# Patient Record
Sex: FEMALE | Race: WHITE | NOT HISPANIC OR LATINO | URBAN - METROPOLITAN AREA
[De-identification: names, ages, dates, MRNs, and addresses within clinical notes are randomized per-mention and may not be internally consistent; named-entity substitution may affect disease eponyms.]

---

## 2018-06-04 ENCOUNTER — OFFICE (AMBULATORY)
Dept: URBAN - METROPOLITAN AREA PATHOLOGY 4 | Facility: PATHOLOGY | Age: 48
End: 2018-06-04

## 2018-06-04 ENCOUNTER — ON CAMPUS - OUTPATIENT (AMBULATORY)
Dept: URBAN - METROPOLITAN AREA HOSPITAL 2 | Facility: HOSPITAL | Age: 48
End: 2018-06-04
Payer: COMMERCIAL

## 2018-06-04 VITALS
DIASTOLIC BLOOD PRESSURE: 99 MMHG | SYSTOLIC BLOOD PRESSURE: 159 MMHG | HEIGHT: 63 IN | HEART RATE: 71 BPM | HEART RATE: 78 BPM | DIASTOLIC BLOOD PRESSURE: 91 MMHG | HEART RATE: 83 BPM | DIASTOLIC BLOOD PRESSURE: 106 MMHG | SYSTOLIC BLOOD PRESSURE: 123 MMHG | SYSTOLIC BLOOD PRESSURE: 156 MMHG | SYSTOLIC BLOOD PRESSURE: 128 MMHG | SYSTOLIC BLOOD PRESSURE: 136 MMHG | DIASTOLIC BLOOD PRESSURE: 86 MMHG | OXYGEN SATURATION: 95 % | OXYGEN SATURATION: 97 % | RESPIRATION RATE: 16 BRPM | HEART RATE: 67 BPM | DIASTOLIC BLOOD PRESSURE: 89 MMHG | RESPIRATION RATE: 18 BRPM | TEMPERATURE: 97.6 F | OXYGEN SATURATION: 96 % | WEIGHT: 186.8 LBS | HEART RATE: 70 BPM | HEART RATE: 73 BPM | SYSTOLIC BLOOD PRESSURE: 139 MMHG | SYSTOLIC BLOOD PRESSURE: 144 MMHG | OXYGEN SATURATION: 99 % | SYSTOLIC BLOOD PRESSURE: 132 MMHG | HEART RATE: 72 BPM

## 2018-06-04 DIAGNOSIS — K64.1 SECOND DEGREE HEMORRHOIDS: ICD-10-CM

## 2018-06-04 DIAGNOSIS — Z86.010 PERSONAL HISTORY OF COLONIC POLYPS: ICD-10-CM

## 2018-06-04 DIAGNOSIS — Z80.0 FAMILY HISTORY OF MALIGNANT NEOPLASM OF DIGESTIVE ORGANS: ICD-10-CM

## 2018-06-04 DIAGNOSIS — K21.9 GASTRO-ESOPHAGEAL REFLUX DISEASE WITHOUT ESOPHAGITIS: ICD-10-CM

## 2018-06-04 DIAGNOSIS — D12.5 BENIGN NEOPLASM OF SIGMOID COLON: ICD-10-CM

## 2018-06-04 LAB
GI HISTOLOGY: A. UNSPECIFIED: (no result)
GI HISTOLOGY: PDF REPORT: (no result)

## 2018-06-04 PROCEDURE — 45385 COLONOSCOPY W/LESION REMOVAL: CPT | Performed by: INTERNAL MEDICINE

## 2018-06-04 PROCEDURE — 43235 EGD DIAGNOSTIC BRUSH WASH: CPT | Performed by: INTERNAL MEDICINE

## 2018-06-04 PROCEDURE — 88305 TISSUE EXAM BY PATHOLOGIST: CPT | Mod: 33 | Performed by: INTERNAL MEDICINE

## 2018-06-04 RX ADMIN — PROPOFOL: 10 INJECTION, EMULSION INTRAVENOUS at 13:36

## 2018-12-17 ENCOUNTER — OUTSIDE FACILITY SERVICE (OUTPATIENT)
Dept: CARDIAC SURGERY | Facility: CLINIC | Age: 48
End: 2018-12-17

## 2018-12-17 PROCEDURE — 33517 CABG ARTERY-VEIN SINGLE: CPT | Performed by: THORACIC SURGERY (CARDIOTHORACIC VASCULAR SURGERY)

## 2018-12-17 PROCEDURE — 33508 ENDOSCOPIC VEIN HARVEST: CPT | Performed by: THORACIC SURGERY (CARDIOTHORACIC VASCULAR SURGERY)

## 2018-12-17 PROCEDURE — 33533 CABG ARTERIAL SINGLE: CPT

## 2018-12-17 PROCEDURE — 33517 CABG ARTERY-VEIN SINGLE: CPT

## 2018-12-17 PROCEDURE — 33508 ENDOSCOPIC VEIN HARVEST: CPT

## 2018-12-17 PROCEDURE — 33533 CABG ARTERIAL SINGLE: CPT | Performed by: THORACIC SURGERY (CARDIOTHORACIC VASCULAR SURGERY)

## 2018-12-21 ENCOUNTER — TELEPHONE (OUTPATIENT)
Dept: CARDIAC SURGERY | Facility: CLINIC | Age: 48
End: 2018-12-21

## 2019-01-30 ENCOUNTER — OFFICE VISIT (OUTPATIENT)
Dept: CARDIAC SURGERY | Facility: CLINIC | Age: 49
End: 2019-01-30

## 2019-01-30 VITALS
OXYGEN SATURATION: 97 % | RESPIRATION RATE: 20 BRPM | WEIGHT: 190.6 LBS | BODY MASS INDEX: 33.77 KG/M2 | DIASTOLIC BLOOD PRESSURE: 88 MMHG | HEART RATE: 92 BPM | SYSTOLIC BLOOD PRESSURE: 130 MMHG | TEMPERATURE: 97.6 F | HEIGHT: 63 IN

## 2019-01-30 DIAGNOSIS — Z95.1 HX OF CABG: Primary | ICD-10-CM

## 2019-01-30 PROCEDURE — 99024 POSTOP FOLLOW-UP VISIT: CPT | Performed by: THORACIC SURGERY (CARDIOTHORACIC VASCULAR SURGERY)

## 2019-01-30 RX ORDER — ASPIRIN 325 MG
81 TABLET ORAL DAILY
COMMUNITY
End: 2020-07-10 | Stop reason: DRUGHIGH

## 2019-01-30 RX ORDER — METOPROLOL SUCCINATE 25 MG/1
25 TABLET, EXTENDED RELEASE ORAL DAILY
COMMUNITY
End: 2019-12-23 | Stop reason: SDUPTHER

## 2019-01-30 RX ORDER — POTASSIUM CHLORIDE 750 MG/1
10 TABLET, FILM COATED, EXTENDED RELEASE ORAL 2 TIMES DAILY
COMMUNITY
End: 2019-08-20

## 2019-01-30 RX ORDER — FUROSEMIDE 20 MG/1
20 TABLET ORAL DAILY
COMMUNITY

## 2019-01-30 RX ORDER — CETIRIZINE HYDROCHLORIDE 10 MG/1
10 TABLET ORAL DAILY
COMMUNITY
End: 2020-07-10

## 2019-01-30 NOTE — PROGRESS NOTES
1/30/2019       Subjective:     Primary Care Physician: Hung Alex MD    Chief Complaint: Routine post-op visit    History of Present Illness:       Dear Colleagues,    I had the pleasure of seeing Khushi Reed in the office following her CABG x 2 on 2018/12/17.    She reports that she is doing very well.    She denies chest pain and shortness of breath.    There is no problem list on file for this patient.      Past Medical History:   Diagnosis Date   • 3-vessel CAD 12/16/2018    Severe w/Occlusion of LAD & 70% Narrowing of RCA Noted on Cardiac Cath   • Abnormal stress test 12/14/2018-[facility]    Reversible Anterolateral Ischemia in the Territory of the LAD   • Anxiety     Lexapro   • Asthma    • Carotid stenosis, bilateral 12/16/2018    R Noted @ 0-15% & L Noted @ 0-15% Noted on Carotid Report   • Chest pain due to CAD 12/14/18-[facility]    Severe w/Several Months of SOB Episodes   • Chronic diarrhea     Due to IBS   • COPD (chronic obstructive pulmonary disease) (CMS/HCC) Hx   • Dyslipidemia    • Dyspepsia Hx    Prilosec   • Dyspnea 2018   • Family history of premature CAD     Paternal Side/Father   • GERD (gastroesophageal reflux disease)     Prilosec   • History of echocardiogram 12/15/18-[facility]    Trivial MVR/Mild TVR Noted, Otherwise All Normal Findings   • History of EKG 12/14/2018    Normal Sinus Rhythm    • Hormone replacement therapy (HRT)    • Hx of chest x-ray 12/14/18-[facility]    WNL   • Hypertension     Controlled w/Meds   • Hypokalemia 12/14/18-[facility]    3.4-Treated   • IBS (irritable bowel syndrome)    • LAD stenosis 12/16/2018    Subtotally Occluded Distal L Noted on Cardiac Cath   • Mild mitral valve regurgitation 12/15/2018    Trivial-Noted on Echo   • Mild tricuspid valve regurgitation 12/15/2018    Noted on Echo   • Obesity    • JS on CPAP    • RCA occlusion (CMS/HCC) 12/16/2018    70% Narrowing Noted on Cardiac Cath   • Seasonal allergies     Claritin PRN   • Snoring    • SOB (shortness  of breath) 2018    x Several Months       Past Surgical History:   Procedure Laterality Date   • CARDIAC CATHETERIZATION Left 12/16/18-[facility]    w/Coronary Arteriography/Angiography--Dr. Madera--Severe 3V CAD w/Occluded LAD & 70% Narrowing of RCA; Well-Preserved LV Function Noted   • CORONARY ARTERY BYPASS GRAFT  12/17/2018    X2  Dr Mills   • HYSTERECTOMY         Allergies   Allergen Reactions   • Latex Unknown (See Comments)     Unknown   • Other Itching and Rash     Natural Rubber         Current Outpatient Medications:   •  aspirin 325 MG tablet, Take 325 mg by mouth Daily., Disp: , Rfl:   •  atorvastatin (LIPITOR) 20 MG tablet, , Disp: , Rfl:   •  cetirizine (zyrTEC) 10 MG tablet, Take 10 mg by mouth Daily., Disp: , Rfl:   •  escitalopram (LEXAPRO) 10 MG tablet, Take 10 mg by mouth Daily., Disp: , Rfl: 1  •  furosemide (LASIX) 20 MG tablet, Take 20 mg by mouth 2 (Two) Times a Day., Disp: , Rfl:   •  lansoprazole (PREVACID) 15 MG capsule, Take 15 mg by mouth Daily., Disp: , Rfl: 4  •  losartan-hydrochlorothiazide (HYZAAR) 100-12.5 MG per tablet, Take 1 tablet by mouth Daily., Disp: , Rfl: 0  •  metoprolol succinate XL (TOPROL-XL) 25 MG 24 hr tablet, Take 25 mg by mouth Daily., Disp: , Rfl:   •  montelukast (SINGULAIR) 10 MG tablet, Take 10 mg by mouth Daily., Disp: , Rfl: 1  •  potassium chloride (K-DUR) 10 MEQ CR tablet, Take 10 mEq by mouth 2 (Two) Times a Day., Disp: , Rfl:   •  progesterone (PROMETRIUM) 100 MG capsule, Take 100 mg by mouth Daily., Disp: , Rfl:     Social History     Socioeconomic History   • Marital status:      Spouse name: Not on file   • Number of children: Not on file   • Years of education: Not on file   • Highest education level: Not on file   Social Needs   • Financial resource strain: Not on file   • Food insecurity - worry: Not on file   • Food insecurity - inability: Not on file   • Transportation needs - medical: Not on file   • Transportation needs - non-medical: Not on  file   Occupational History   • Occupation: UNEMPLOYED   Tobacco Use   • Smoking status: Never Smoker   • Smokeless tobacco: Never Used   Substance and Sexual Activity   • Alcohol use: Yes     Binge frequency: Monthly     Comment: 1 Drink/Month   • Drug use: No   • Sexual activity: Defer     Birth control/protection: Surgical     Comment: Hyst   Other Topics Concern   • Not on file   Social History Narrative   • Not on file       Family History   Problem Relation Age of Onset   • Coronary artery disease Father         CABG Age 45         Review of Systems:    Review of Systems   Constitutional: Negative for chills, diaphoresis and fatigue.   Respiratory: Negative for chest tightness, shortness of breath and wheezing.    Cardiovascular: Negative for chest pain and leg swelling.   Neurological: Negative for syncope and light-headedness.       Physical Exam:    Vital Signs:  Weight: 86.5 kg (190 lb 9.6 oz)   Body mass index is 33.76 kg/m².  Temp: 97.6 °F (36.4 °C)   Heart Rate: 92   BP: 130/88     Physical Exam   Constitutional: She appears well-developed and well-nourished. No distress.   Cardiovascular: Normal rate, regular rhythm and normal heart sounds. Exam reveals no gallop and no friction rub.   No murmur heard.  Pulmonary/Chest: Effort normal and breath sounds normal. No respiratory distress. She has no wheezes.   Skin: Skin is warm and dry. She is not diaphoretic.       The sternum is stable and the incisions are well-healed.     Assessment:     48 y.o. female , s/p CABG.    Doing very well.        Recommendation/Plan:     Sternal precautions lifted.    Proceed with cardiac rehab as advised by Dr. Madera.    No further surgical intervention required at this time.    Thank you for allowing me to participate in his care.      Best regards,    Barry Mills MD, PhD

## 2019-01-31 ENCOUNTER — TELEPHONE (OUTPATIENT)
Dept: CARDIAC SURGERY | Facility: CLINIC | Age: 49
End: 2019-01-31

## 2019-01-31 NOTE — TELEPHONE ENCOUNTER
Pt called and states she has a number for a RTW note she needs faxed to employer @ [phone] Attention to Brunilda Diaz. She said she had spoke to you yesterday about returning back today. Thanks!

## 2019-07-23 ENCOUNTER — TELEPHONE (OUTPATIENT)
Dept: CARDIOLOGY | Facility: CLINIC | Age: 49
End: 2019-07-23

## 2019-08-20 ENCOUNTER — OFFICE VISIT (OUTPATIENT)
Dept: CARDIOLOGY | Facility: CLINIC | Age: 49
End: 2019-08-20

## 2019-08-20 VITALS
HEIGHT: 64 IN | BODY MASS INDEX: 33.63 KG/M2 | DIASTOLIC BLOOD PRESSURE: 84 MMHG | OXYGEN SATURATION: 98 % | HEART RATE: 66 BPM | SYSTOLIC BLOOD PRESSURE: 140 MMHG | WEIGHT: 197 LBS

## 2019-08-20 DIAGNOSIS — I10 ESSENTIAL HYPERTENSION: ICD-10-CM

## 2019-08-20 DIAGNOSIS — Z95.1 STATUS POST CORONARY ARTERY BYPASS GRAFT: ICD-10-CM

## 2019-08-20 DIAGNOSIS — I25.10 CORONARY ARTERY DISEASE INVOLVING NATIVE CORONARY ARTERY OF NATIVE HEART WITHOUT ANGINA PECTORIS: Primary | ICD-10-CM

## 2019-08-20 PROBLEM — E78.2 HYPERLIPIDEMIA, MIXED: Status: ACTIVE | Noted: 2019-08-20

## 2019-08-20 PROBLEM — R06.09 DYSPNEA ON EXERTION: Status: ACTIVE | Noted: 2019-08-20

## 2019-08-20 PROBLEM — J45.909 ASTHMA: Status: ACTIVE | Noted: 2019-01-11

## 2019-08-20 PROCEDURE — 99213 OFFICE O/P EST LOW 20 MIN: CPT | Performed by: INTERNAL MEDICINE

## 2019-08-20 PROCEDURE — 93000 ELECTROCARDIOGRAM COMPLETE: CPT | Performed by: INTERNAL MEDICINE

## 2019-08-20 RX ORDER — POTASSIUM CHLORIDE 20 MEQ/1
TABLET, EXTENDED RELEASE ORAL DAILY
COMMUNITY
Start: 2019-07-15

## 2019-08-20 RX ORDER — FLUCONAZOLE 150 MG/1
TABLET ORAL
Refills: 0 | COMMUNITY
Start: 2019-07-09 | End: 2019-08-20

## 2019-08-20 RX ORDER — FLUTICASONE PROPIONATE 50 MCG
2 SPRAY, SUSPENSION (ML) NASAL DAILY
COMMUNITY
Start: 2019-01-11

## 2019-08-20 RX ORDER — HYDROCODONE BITARTRATE AND ACETAMINOPHEN 5; 325 MG/1; MG/1
TABLET ORAL
COMMUNITY
End: 2019-08-20

## 2019-08-20 RX ORDER — FERROUS SULFATE 325(65) MG
TABLET ORAL DAILY
COMMUNITY
Start: 2019-01-11

## 2019-08-20 RX ORDER — ALBUTEROL SULFATE 90 UG/1
AEROSOL, METERED RESPIRATORY (INHALATION) DAILY PRN
COMMUNITY
Start: 2019-01-11

## 2019-08-20 NOTE — PROGRESS NOTES
Cardiology Office Visit Note      Referring physician:  Natalia    Reason For Followup: 6 month FU    HPI:  Khushi Reed is a 49 y.o. female presents FU 2 V CABG (LIMA to LAD and SVG to PDA, unable to graft post lat marginal branch) on 12/17/18 per Dr. Reddy       Echo 12/16/18 LVEF 60%, structurally normal heart, trivial MR and RVSP 30mmHg    Recent TMT 1/24/19: negative for exercise induced ischemia.  Completed 8 weeks cardiac rehab following TMT.    Continues to report PIERCE despite work out at the [facility] 5 evenings per week.      Specifically enies chest pain,  PND, orthopnea, palpitations, near syncope, lower extremity edema or feelings of her heart racing.  She reports she has been compliant with prescribed medications.   .     Past Medical History:   Diagnosis Date   • 3-vessel CAD 12/16/2018    Severe w/Occlusion of LAD & 70% Narrowing of RCA Noted on Cardiac Cath   • Abnormal stress test 12/14/2018-[facility]    Reversible Anterolateral Ischemia in the Territory of the LAD   • Anxiety     Lexapro   • Asthma    • Carotid stenosis, bilateral 12/16/2018    R Noted @ 0-15% & L Noted @ 0-15% Noted on Carotid Report   • Chest pain due to CAD 12/14/18-[facility]    Severe w/Several Months of SOB Episodes   • Chronic diarrhea     Due to IBS   • COPD (chronic obstructive pulmonary disease) (CMS/[facility]) Hx   • Dyslipidemia    • Dyspepsia Hx    Prilosec   • Dyspnea 2018   • Family history of premature CAD     Paternal Side/Father   • GERD (gastroesophageal reflux disease)     Prilosec   • History of echocardiogram 12/15/18-[facility]    Trivial MVR/Mild TVR Noted, Otherwise All Normal Findings   • History of EKG 12/14/2018    Normal Sinus Rhythm    • Hormone replacement therapy (HRT)    • Hx of chest x-ray 12/14/18-[facility]    WNL   • Hypertension     Controlled w/Meds   • Hypokalemia 12/14/18-[facility]    3.4-Treated   • IBS (irritable bowel syndrome)    • LAD stenosis 12/16/2018    Subtotally Occluded Distal L Noted on Cardiac Cath   • Mild  mitral valve regurgitation 12/15/2018    Trivial-Noted on Echo   • Mild tricuspid valve regurgitation 12/15/2018    Noted on Echo   • Obesity    • JS on CPAP    • RCA occlusion (CMS/HCC) 12/16/2018    70% Narrowing Noted on Cardiac Cath   • Seasonal allergies     Claritin PRN   • Snoring    • SOB (shortness of breath) 2018    x Several Months       Past Surgical History:   Procedure Laterality Date   • CARDIAC CATHETERIZATION Left 12/16/18-[facility]    w/Coronary Arteriography/Angiography--Dr. Madera--Severe 3V CAD w/Occluded LAD & 70% Narrowing of RCA; Well-Preserved LV Function Noted   • CORONARY ARTERY BYPASS GRAFT  12/17/2018    X2  Dr Mills   • HYSTERECTOMY           Current Outpatient Medications:   •  albuterol sulfate HFA (VENTOLIN HFA) 108 (90 Base) MCG/ACT inhaler, Daily As Needed., Disp: , Rfl:   •  aspirin 325 MG tablet, Take 325 mg by mouth Daily., Disp: , Rfl:   •  atorvastatin (LIPITOR) 20 MG tablet, , Disp: , Rfl:   •  cetirizine (zyrTEC) 10 MG tablet, Take 10 mg by mouth Daily., Disp: , Rfl:   •  Cyanocobalamin 1000 MCG/ML kit, Inject 1 mL into the appropriate muscle as directed by prescriber. monthly, Disp: , Rfl:   •  escitalopram (LEXAPRO) 10 MG tablet, Take 10 mg by mouth Daily., Disp: , Rfl: 1  •  ferrous sulfate 325 (65 FE) MG tablet, Daily., Disp: , Rfl:   •  fluticasone (FLONASE) 50 MCG/ACT nasal spray, FLONASE 50 MCG/ACT NASAL SUSPENSION, Disp: , Rfl:   •  furosemide (LASIX) 20 MG tablet, Take 20 mg by mouth 2 (Two) Times a Day., Disp: , Rfl:   •  lansoprazole (PREVACID) 15 MG capsule, Take 15 mg by mouth Daily., Disp: , Rfl: 4  •  metoprolol succinate XL (TOPROL-XL) 25 MG 24 hr tablet, Take 25 mg by mouth Daily., Disp: , Rfl:   •  montelukast (SINGULAIR) 10 MG tablet, Take 10 mg by mouth Daily., Disp: , Rfl: 1  •  potassium chloride (K-DUR,KLOR-CON) 20 MEQ CR tablet, Daily., Disp: , Rfl:   •  progesterone (PROMETRIUM) 100 MG capsule, Take 100 mg by mouth Daily., Disp: , Rfl:     Social  "History     Socioeconomic History   • Marital status:      Spouse name: Not on file   • Number of children: Not on file   • Years of education: Not on file   • Highest education level: Not on file   Occupational History   • Occupation: UNEMPLOYED   Tobacco Use   • Smoking status: Never Smoker   • Smokeless tobacco: Never Used   Substance and Sexual Activity   • Alcohol use: Yes     Binge frequency: Monthly     Comment: 1 Drink/Month   • Drug use: No   • Sexual activity: Defer     Birth control/protection: Surgical     Comment: Hyst       Family History   Problem Relation Age of Onset   • Coronary artery disease Father         CABG Age 45         Review of Systems   General: denies fever, chills, anorexia, weight loss  Eyes: denies blurring, diplopia  Ear/Nose/Throat: denies ear pain, nosebleeds, hoarseness  Cardiovascular: See HPI  Respiratory: denies excessive sputum, hemoptysis, wheezing  Gastrointestinal: denies nausea, vomiting, change in bowel habits, abdominal pain  Genitourinary: denies dysuria and hematuria  Musculoskeletal: denies back pain, joint pain, joint swelling, muscle cramps, weakness  Skin: denies rashes, itching, suspicious lesions  Neurologic: denies focal neuro deficits  Psychiatric: denies depression, anxiety  Endocrine: denies cold intolerance, heat intolerance  Hematologic/Lymphatic: denies abnormal bruising, bleeding  Allergic/Immunologic: denies urticaria or persistent infections      Objective     Visit Vitals  /84   Pulse 66   Ht 161.3 cm (63.5\")   Wt 89.4 kg (197 lb)   SpO2 98% Comment: room air   BMI 34.35 kg/m²           Physical Exam  General:     Obese, well developed,, in no acute distress.    Head:     normocephalic and atraumatic.    Eyes:    PERRL/EOM intact, conjunctiva and sclera clear with out nystagmus.    Neck:    no jvd or bruits  Chest Wall:    no deformities   Lungs:    clear bilaterally to auscultation with adequate global airflow   Heart:    non-displaced " PMI; regular rate and rhythm, normal S1, S2 without murmurs, rubs, or gallops  Abdomen:  Soft, nontender without HSM  Msk:    no deformity; adequate R OM  Pulses:    pulses normal in all 4 extremities.    Extremities:    no clubbing, cyanosis, edema   Neurologic:    no focal sensory or motor deficits  Skin:    intact without lesions or rashes.    Psych:    alert and cooperative; normal mood and affect; normal attention span and concentration.            ECG 12 Lead  Date/Time: 8/20/2019 1:07 PM  Performed by: CRISSY Madera MD  Authorized by: CRISSY Madera MD   Comparison: compared with previous ECG from 1/11/2019  Similar to previous ECG  Comments: Essentially normal tracing with sinus rhythm; no significant change from previous EKG              Assessment:   1. Coronary artery disease involving native coronary artery of native heart without angina pectoris; status post two-vessel CABG December 2018  -Remains hemodynamically well compensated and angina free    2. Essential hypertension  -Remains well-regulated on current medications listed and reviewed    3. Status post coronary artery bypass graft  -Two-vessel CABG with LIMA to LAD and SVG to PDA December 2018  -Angina free; well compensated        Plan:    -We will encourage weight reduction and regular progressive exercise and low-fat diet with ongoing risk factor modification   -Return to clinic for follow-up 1 year or sooner if needed  .  continue current medication as listed and reviewed in detail today.    CRISSY Madera MD  8/25/2019 1:05 PM

## 2019-12-23 RX ORDER — METOPROLOL SUCCINATE 25 MG/1
TABLET, EXTENDED RELEASE ORAL
Qty: 240 TABLET | Refills: 3 | Status: SHIPPED | OUTPATIENT
Start: 2019-12-23 | End: 2020-11-05 | Stop reason: DRUGHIGH

## 2020-03-11 RX ORDER — ATORVASTATIN CALCIUM 40 MG/1
TABLET, FILM COATED ORAL
Qty: 90 TABLET | Refills: 2 | Status: SHIPPED | OUTPATIENT
Start: 2020-03-11 | End: 2020-10-22

## 2020-07-08 ENCOUNTER — APPOINTMENT (OUTPATIENT)
Dept: ULTRASOUND IMAGING | Facility: HOSPITAL | Age: 50
End: 2020-07-08

## 2020-07-08 ENCOUNTER — HOSPITAL ENCOUNTER (EMERGENCY)
Facility: HOSPITAL | Age: 50
Discharge: HOME | End: 2020-07-08
Admitting: EMERGENCY MEDICINE

## 2020-07-08 ENCOUNTER — APPOINTMENT (OUTPATIENT)
Dept: GENERAL RADIOLOGY | Facility: HOSPITAL | Age: 50
End: 2020-07-08

## 2020-07-08 VITALS
HEART RATE: 62 BPM | OXYGEN SATURATION: 96 % | SYSTOLIC BLOOD PRESSURE: 129 MMHG | RESPIRATION RATE: 16 BRPM | BODY MASS INDEX: 36.68 KG/M2 | WEIGHT: 207.01 LBS | DIASTOLIC BLOOD PRESSURE: 69 MMHG | TEMPERATURE: 98.1 F | HEIGHT: 63 IN

## 2020-07-08 DIAGNOSIS — M25.511 ACUTE PAIN OF RIGHT SHOULDER: Primary | ICD-10-CM

## 2020-07-08 DIAGNOSIS — R11.0 NAUSEA: ICD-10-CM

## 2020-07-08 LAB
ALBUMIN SERPL-MCNC: 4.5 G/DL (ref 3.5–5.2)
ALBUMIN/GLOB SERPL: 1.9 G/DL
ALP SERPL-CCNC: 78 U/L (ref 39–117)
ALT SERPL W P-5'-P-CCNC: 22 U/L (ref 1–33)
ANION GAP SERPL CALCULATED.3IONS-SCNC: 10 MMOL/L (ref 5–15)
AST SERPL-CCNC: 21 U/L (ref 1–32)
BASOPHILS # BLD AUTO: 0 10*3/MM3 (ref 0–0.2)
BASOPHILS NFR BLD AUTO: 0.4 % (ref 0–1.5)
BILIRUB SERPL-MCNC: 0.2 MG/DL (ref 0–1.2)
BUN SERPL-MCNC: 12 MG/DL (ref 6–20)
BUN SERPL-MCNC: ABNORMAL MG/DL
BUN/CREAT SERPL: ABNORMAL
CALCIUM SPEC-SCNC: 9.4 MG/DL (ref 8.6–10.5)
CHLORIDE SERPL-SCNC: 102 MMOL/L (ref 98–107)
CO2 SERPL-SCNC: 29 MMOL/L (ref 22–29)
CREAT SERPL-MCNC: 0.99 MG/DL (ref 0.57–1)
D DIMER PPP FEU-MCNC: 0.2 MCGFEU/ML (ref 0.17–0.59)
DEPRECATED RDW RBC AUTO: 38.9 FL (ref 37–54)
EOSINOPHIL # BLD AUTO: 0.1 10*3/MM3 (ref 0–0.4)
EOSINOPHIL NFR BLD AUTO: 1.4 % (ref 0.3–6.2)
ERYTHROCYTE [DISTWIDTH] IN BLOOD BY AUTOMATED COUNT: 12.7 % (ref 12.3–15.4)
GFR SERPL CREATININE-BSD FRML MDRD: 60 ML/MIN/1.73
GLOBULIN UR ELPH-MCNC: 2.4 GM/DL
GLUCOSE SERPL-MCNC: 115 MG/DL (ref 65–99)
HCT VFR BLD AUTO: 39.7 % (ref 34–46.6)
HGB BLD-MCNC: 13.4 G/DL (ref 12–15.9)
LIPASE SERPL-CCNC: 38 U/L (ref 13–60)
LYMPHOCYTES # BLD AUTO: 2.9 10*3/MM3 (ref 0.7–3.1)
LYMPHOCYTES NFR BLD AUTO: 34.7 % (ref 19.6–45.3)
MCH RBC QN AUTO: 29.2 PG (ref 26.6–33)
MCHC RBC AUTO-ENTMCNC: 33.7 G/DL (ref 31.5–35.7)
MCV RBC AUTO: 86.4 FL (ref 79–97)
MONOCYTES # BLD AUTO: 0.5 10*3/MM3 (ref 0.1–0.9)
MONOCYTES NFR BLD AUTO: 6.4 % (ref 5–12)
NEUTROPHILS NFR BLD AUTO: 4.8 10*3/MM3 (ref 1.7–7)
NEUTROPHILS NFR BLD AUTO: 57.1 % (ref 42.7–76)
NRBC BLD AUTO-RTO: 0.1 /100 WBC (ref 0–0.2)
NT-PROBNP SERPL-MCNC: 54.8 PG/ML (ref 0–450)
PLATELET # BLD AUTO: 228 10*3/MM3 (ref 140–450)
PMV BLD AUTO: 8 FL (ref 6–12)
POTASSIUM SERPL-SCNC: 4.2 MMOL/L (ref 3.5–5.2)
PROT SERPL-MCNC: 6.9 G/DL (ref 6–8.5)
RBC # BLD AUTO: 4.59 10*6/MM3 (ref 3.77–5.28)
SODIUM SERPL-SCNC: 141 MMOL/L (ref 136–145)
TROPONIN T SERPL-MCNC: <0.01 NG/ML (ref 0–0.03)
WBC # BLD AUTO: 8.4 10*3/MM3 (ref 3.4–10.8)

## 2020-07-08 PROCEDURE — 83690 ASSAY OF LIPASE: CPT | Performed by: NURSE PRACTITIONER

## 2020-07-08 PROCEDURE — 71045 X-RAY EXAM CHEST 1 VIEW: CPT

## 2020-07-08 PROCEDURE — 80053 COMPREHEN METABOLIC PANEL: CPT | Performed by: NURSE PRACTITIONER

## 2020-07-08 PROCEDURE — 93005 ELECTROCARDIOGRAM TRACING: CPT | Performed by: NURSE PRACTITIONER

## 2020-07-08 PROCEDURE — 84484 ASSAY OF TROPONIN QUANT: CPT | Performed by: NURSE PRACTITIONER

## 2020-07-08 PROCEDURE — 76705 ECHO EXAM OF ABDOMEN: CPT

## 2020-07-08 PROCEDURE — 99284 EMERGENCY DEPT VISIT MOD MDM: CPT

## 2020-07-08 PROCEDURE — 85379 FIBRIN DEGRADATION QUANT: CPT | Performed by: NURSE PRACTITIONER

## 2020-07-08 PROCEDURE — 83880 ASSAY OF NATRIURETIC PEPTIDE: CPT | Performed by: NURSE PRACTITIONER

## 2020-07-08 PROCEDURE — 85025 COMPLETE CBC W/AUTO DIFF WBC: CPT | Performed by: NURSE PRACTITIONER

## 2020-07-08 RX ORDER — ALUMINA, MAGNESIA, AND SIMETHICONE 2400; 2400; 240 MG/30ML; MG/30ML; MG/30ML
15 SUSPENSION ORAL ONCE
Status: COMPLETED | OUTPATIENT
Start: 2020-07-08 | End: 2020-07-08

## 2020-07-08 RX ORDER — DOCUSATE SODIUM 100 MG/1
100 CAPSULE, LIQUID FILLED ORAL NIGHTLY
COMMUNITY

## 2020-07-08 RX ORDER — LIDOCAINE HYDROCHLORIDE 20 MG/ML
15 SOLUTION OROPHARYNGEAL ONCE
Status: COMPLETED | OUTPATIENT
Start: 2020-07-08 | End: 2020-07-08

## 2020-07-08 RX ORDER — GLUCOSAMINE SULFATE DIPOT CHLR 1000 MG
TABLET ORAL NIGHTLY
COMMUNITY

## 2020-07-08 RX ORDER — LISINOPRIL 10 MG/1
10 TABLET ORAL NIGHTLY
COMMUNITY
End: 2020-11-12

## 2020-07-08 RX ORDER — LORATADINE 10 MG/1
10 TABLET ORAL DAILY
COMMUNITY

## 2020-07-08 RX ORDER — ASPIRIN 81 MG/1
81 TABLET ORAL DAILY
COMMUNITY

## 2020-07-08 RX ORDER — MULTIPLE VITAMINS W/ MINERALS TAB 9MG-400MCG
1 TAB ORAL DAILY
COMMUNITY

## 2020-07-08 RX ORDER — SODIUM CHLORIDE 0.9 % (FLUSH) 0.9 %
10 SYRINGE (ML) INJECTION AS NEEDED
Status: DISCONTINUED | OUTPATIENT
Start: 2020-07-08 | End: 2020-07-08 | Stop reason: HOSPADM

## 2020-07-08 RX ADMIN — ALUMINUM HYDROXIDE, MAGNESIUM HYDROXIDE, AND DIMETHICONE 15 ML: 400; 400; 40 SUSPENSION ORAL at 20:46

## 2020-07-08 RX ADMIN — LIDOCAINE HYDROCHLORIDE 15 ML: 20 SOLUTION ORAL; TOPICAL at 20:46

## 2020-07-08 NOTE — ED PROVIDER NOTES
Subjective   Patient is a 50-year-old female with a history of a three-vessel CABG, who is emergency department complaint of right shoulder pain, increased nausea onset 145 today, as well as some associated increased gastric reflux over the past few days.  Patient reports that she is had no injury to her shoulder.  She is had no recent trauma or falls.  She reports that when she previously had her CABG she had similar symptoms.  She is not reporting any chest pain or shortness of breath.  No leg pain or swelling.  No aggravating or relieving factors.  Nonradiating.          Review of Systems   Constitutional: Negative for chills and fever.   Respiratory: Negative for cough, chest tightness and shortness of breath.    Cardiovascular: Negative for chest pain, palpitations and leg swelling.   Gastrointestinal: Positive for abdominal distention and nausea. Negative for abdominal pain, anal bleeding, blood in stool, constipation, diarrhea, rectal pain and vomiting.   Genitourinary: Negative for dysuria.   Musculoskeletal: Negative for back pain, neck pain and neck stiffness.        Right shoulder pain   Skin: Negative.    Neurological: Negative for dizziness, syncope and light-headedness.       Past Medical History:   Diagnosis Date   • 3-vessel CAD 12/16/2018    Severe w/Occlusion of LAD & 70% Narrowing of RCA Noted on Cardiac Cath   • Abnormal stress test 12/14/2018-[facility]    Reversible Anterolateral Ischemia in the Territory of the LAD   • Anxiety     Lexapro   • Asthma    • Carotid stenosis, bilateral 12/16/2018    R Noted @ 0-15% & L Noted @ 0-15% Noted on Carotid Report   • Chest pain due to CAD (CMS/[facility]) 12/14/18-[facility]    Severe w/Several Months of SOB Episodes   • Chronic diarrhea     Due to IBS   • COPD (chronic obstructive pulmonary disease) (CMS/[facility]) Hx   • Dyslipidemia    • Dyspepsia Hx    Prilosec   • Dyspnea 2018   • Family history of premature CAD     Paternal Side/Father   • GERD (gastroesophageal reflux  disease)     Prilosec   • History of echocardiogram 12/15/18-[facility]    Trivial MVR/Mild TVR Noted, Otherwise All Normal Findings   • History of EKG 12/14/2018    Normal Sinus Rhythm    • Hormone replacement therapy (HRT)    • Hx of chest x-ray 12/14/18-[facility] ER    WNL   • Hypertension     Controlled w/Meds   • Hypokalemia 12/14/18-[facility]    3.4-Treated   • IBS (irritable bowel syndrome)    • LAD stenosis 12/16/2018    Subtotally Occluded Distal L Noted on Cardiac Cath   • Mild mitral valve regurgitation 12/15/2018    Trivial-Noted on Echo   • Mild tricuspid valve regurgitation 12/15/2018    Noted on Echo   • Obesity    • JS on CPAP    • RCA occlusion (CMS/HCC) 12/16/2018    70% Narrowing Noted on Cardiac Cath   • Seasonal allergies     Claritin PRN   • Snoring    • SOB (shortness of breath) 2018    x Several Months       Allergies   Allergen Reactions   • Latex Rash     Unknown   • Other Itching and Rash     Natural Rubber       Past Surgical History:   Procedure Laterality Date   • CARDIAC CATHETERIZATION Left 12/16/18-[facility]    w/Coronary Arteriography/Angiography--Dr. Madera--Severe 3V CAD w/Occluded LAD & 70% Narrowing of RCA; Well-Preserved LV Function Noted   • CORONARY ARTERY BYPASS GRAFT  12/17/2018    X2  Dr Mills   • HYSTERECTOMY         Family History   Problem Relation Age of Onset   • Coronary artery disease Father         CABG Age 45       Social History     Socioeconomic History   • Marital status:      Spouse name: Not on file   • Number of children: Not on file   • Years of education: Not on file   • Highest education level: Not on file   Occupational History   • Occupation: UNEMPLOYED   Tobacco Use   • Smoking status: Never Smoker   • Smokeless tobacco: Never Used   Substance and Sexual Activity   • Alcohol use: Yes     Binge frequency: Monthly     Comment: 1 Drink/Month   • Drug use: No   • Sexual activity: Defer     Birth control/protection: Surgical     Comment: Hyst           Objective  "  Physical Exam   Constitutional: She is oriented to person, place, and time. She appears well-developed and well-nourished.   HENT:   Head: Normocephalic and atraumatic.   Right Ear: External ear normal.   Left Ear: External ear normal.   Nose: Nose normal.   Mouth/Throat: Oropharynx is clear and moist.   Eyes: Pupils are equal, round, and reactive to light. Conjunctivae and EOM are normal.   Neck: Normal range of motion. Neck supple.   Cardiovascular: Normal rate, regular rhythm, normal heart sounds and intact distal pulses. Exam reveals no gallop and no friction rub.   No murmur heard.  Pulmonary/Chest: Effort normal and breath sounds normal.   Abdominal: Soft. Bowel sounds are normal. She exhibits no distension. There is no tenderness. There is no rebound and no guarding.   Musculoskeletal: She exhibits no edema or tenderness.   Specifically no calf tenderness or swelling.  No abnormality noted to the right shoulder.   Neurological: She is alert and oriented to person, place, and time.   Skin: Skin is warm and dry. Capillary refill takes less than 2 seconds.   Psychiatric: She has a normal mood and affect. Her behavior is normal. Judgment and thought content normal.   Nursing note and vitals reviewed.      Procedures           ED Course    /69   Pulse 62   Temp 98.1 °F (36.7 °C)   Resp 16   Ht 160 cm (63\")   Wt 93.9 kg (207 lb 0.2 oz)   SpO2 96%   BMI 36.67 kg/m²   Labs Reviewed   COMPREHENSIVE METABOLIC PANEL - Abnormal; Notable for the following components:       Result Value    Glucose 115 (*)     eGFR Non  Amer 60 (*)     All other components within normal limits    Narrative:     GFR Normal >60  Chronic Kidney Disease <60  Kidney Failure <15     LIPASE - Normal   BNP (IN-HOUSE) - Normal    Narrative:     Among patients with dyspnea, NT-proBNP is highly sensitive for the detection of acute congestive heart failure. In addition NT-proBNP of <300 pg/ml effectively rules out acute congestive " heart failure with 99% negative predictive value.    Results may be falsely decreased if patient taking Biotin.     TROPONIN (IN-HOUSE) - Normal    Narrative:     Troponin T Reference Range:  <= 0.03 ng/mL-   Negative for AMI  >0.03 ng/mL-     Abnormal for myocardial necrosis.  Clinicians would have to utilize clinical acumen, EKG, Troponin and serial changes to determine if it is an Acute Myocardial Infarction or myocardial injury due to an underlying chronic condition.       Results may be falsely decreased if patient taking Biotin.     D-DIMER, QUANTITATIVE - Normal    Narrative:     Reference Range  --------------------------------------------------------------------     < 0.50   Negative Predictive Value  0.50-0.59   Indeterminate    >= 0.60   Probable VTE             A very low percentage of patients with DVT may yield D-Dimer results   below the cut-off of 0.50 MCGFEU/mL.  This is known to be more   prevalent in patients with distal DVT.             Results of this test should always be interpreted in conjunction with   the patient's medical history, clinical presentation and other   findings.  Clinical diagnosis should not be based on the result of   INNOVANCE D-Dimer alone.   CBC WITH AUTO DIFFERENTIAL - Normal   BUN - Normal   CBC AND DIFFERENTIAL    Narrative:     The following orders were created for panel order CBC & Differential.  Procedure                               Abnormality         Status                     ---------                               -----------         ------                     CBC Auto Differential[[id number]]        Normal              Final result                 Please view results for these tests on the individual orders.     Medications   sodium chloride 0.9 % flush 10 mL (has no administration in time range)   aluminum-magnesium hydroxide-simethicone (MAALOX MAX) 400-400-40 MG/5ML suspension 15 mL (15 mL Oral Given 7/8/20 2046)   Lidocaine Viscous HCl (XYLOCAINE) 2 % mouth  solution 15 mL (15 mL Mouth/Throat Given 20)     Us Gallbladder    Result Date: 2020  Examination appears within normal limits.  Electronically Signed By-DR. Yonathan Waller MD On:2020 8:09 PM This report was finalized on [id number] by DR. Yonathan Waller MD.    Xr Chest 1 View    Result Date: 2020  No acute cardiopulmonary process identified.  Electronically Signed By-DR. Yonathan Waller MD On:2020 5:56 PM This report was finalized on [id number] by DR. Yonathan Waller MD.      ED Course as of 2302   Wed  Upon re exam patient reported improvement in nausea, upper abdominal discomfort. Discussed admission, however patient reports she has an appt with her PCP, and an appt with Dr. Madera, cardiology and she would prefer to go home, as opposed to staying in the hospital.     [LB]    EKG independently viewed by me, Interpreted by ED physicisan Dr. Marcus.  Rate:63Rhythm:SinusPrevious EK2020    [LB]      ED Course User Index  [LB] Jo Henry, APRN      Appropriate PPE was worn during the duration of the care for this patient while in the emergency department per [facility] guidelines                                     MDM  Number of Diagnoses or Management Options  Acute pain of right shoulder:   Nausea:   Diagnosis management comments: Co morbidities:     Differentials: Acute cholecystitis, gastritis, acute coronary syndrome, PE  This list is not all inclusive and does not constitute the entireity of considered causes.   Labs: Non-concerning, mildly elevated glucose 115 otherwise labs are normal.  Repeat troponin not done given patient has had symptoms for past 2 -3 days.  Initial troponin negative.    Heart score: 3    Imaging, Interpreted per radiologist, independently viewed by myself: Negative gallbladder ultrasound.  No acute findings on chest x-ray.    Patient was brought back to the emergency department room for  evaluation and  placed on appropriate monitoring.  The above work-up was completed.    Plan and Disposition:  Patient is a 50-year-old female who was in the emergency department with a complaint of right shoulder pain, nausea.  Over the past 2 to 3 days she is had some increased nausea increase gastric reflux.  She reports today she began experiencing some sharp pain in her right shoulder which prompted her to come to the emergency department.  Patient with the above exam.  Her work-up here in the ED is reassuring.  Discussed with her admission given her concern with her previous cardiac history, however in shared decision making the patient decided to go home, she has a follow-up appoint with her primary care provider tomorrow, and she is going to call her cardiologist to discuss if follow-up is needed sooner than her appointment which is scheduled for 1 month.  She is remained afebrile and nontoxic while here in the ED.  She was given a GI cocktail which reports improved her nausea, and some of the mild epigastric pain.  She reports no chest pain or shortness of breath.  We extensively discussed return precautions, importance of follow-up, she and has been verbalized understanding.    Dr. Marcus, ED attending physician.  Agrees to treatment plan disposition.         Amount and/or Complexity of Data Reviewed  Clinical lab tests: reviewed  Tests in the radiology section of CPT®: reviewed  Tests in the medicine section of CPT®: reviewed  Independent visualization of images, tracings, or specimens: yes (Imaging was independently viewed by me, and interpreted by the radiologist)        Final diagnoses:   Acute pain of right shoulder   Nausea            Jo Henry, APRN  07/08/20 1223

## 2020-07-08 NOTE — ED NOTES
Rt shoulder blade pain about 1:45 assoc with nausea x 2 days. No vomiting but increased acid reflux x2 days as well. Took 4 mg Zofran PO around 1400      Delma Esqueda RN  07/08/20 8982

## 2020-07-09 NOTE — DISCHARGE INSTRUCTIONS
Please follow up with PCP as scheduled  Return to ED for new or worsening symptoms  Rest  Please call your cardiologists office to discuss sooner appointment

## 2020-07-10 ENCOUNTER — OFFICE VISIT (OUTPATIENT)
Dept: CARDIOLOGY | Facility: CLINIC | Age: 50
End: 2020-07-10

## 2020-07-10 VITALS
HEART RATE: 72 BPM | WEIGHT: 204 LBS | HEIGHT: 63 IN | SYSTOLIC BLOOD PRESSURE: 100 MMHG | BODY MASS INDEX: 36.14 KG/M2 | OXYGEN SATURATION: 100 % | DIASTOLIC BLOOD PRESSURE: 78 MMHG

## 2020-07-10 DIAGNOSIS — R06.09 DYSPNEA ON EXERTION: ICD-10-CM

## 2020-07-10 DIAGNOSIS — I10 ESSENTIAL HYPERTENSION: ICD-10-CM

## 2020-07-10 DIAGNOSIS — I25.10 CORONARY ARTERY DISEASE INVOLVING NATIVE CORONARY ARTERY OF NATIVE HEART WITHOUT ANGINA PECTORIS: Primary | ICD-10-CM

## 2020-07-10 DIAGNOSIS — E78.2 HYPERLIPIDEMIA, MIXED: ICD-10-CM

## 2020-07-10 DIAGNOSIS — Z95.1 STATUS POST CORONARY ARTERY BYPASS GRAFT: ICD-10-CM

## 2020-07-10 PROCEDURE — 99213 OFFICE O/P EST LOW 20 MIN: CPT | Performed by: INTERNAL MEDICINE

## 2020-07-10 PROCEDURE — 93000 ELECTROCARDIOGRAM COMPLETE: CPT | Performed by: INTERNAL MEDICINE

## 2020-07-10 NOTE — PROGRESS NOTES
Cardiology Office Visit Note      Referring physician: Hung Alex MD    Reason For Followup: Clearance for Hida Scan    HPI:  Khushi Reed is a 50 y.o. female.  Presents today for clearance for hida scan.   Hx of 2 V CABG (LIMA to LAD and SVG to PDA, unable to graft post lat marginal branch) on 12/17/18 per Dr. Reddy       Complains of Nausea and Headache and upper back pain that has now subsided.. These right upper quadrant symptoms are associated nausea and occur postprandially.    Echo 12/16/18 LVEF 60%, structurally normal heart, trivial MR and RVSP 30mmHg    Recent TMT 1/24/19: negative for exercise induced ischemia.  Completed 8 weeks cardiac rehab following TMT.  Patient complaints of chest pain or other anginal current symptoms including increased shortness of air PND orthopnea bipedal edema diaphoresis palpitations or near syncope.        Specifically denies chest pain,  PND, orthopnea, palpitations, near syncope, lower extremity edema or feelings of her heart racing.  She reports she has been compliant with prescribed medications.   .       Past Medical History:   Diagnosis Date   • 3-vessel CAD 12/16/2018    Severe w/Occlusion of LAD & 70% Narrowing of RCA Noted on Cardiac Cath   • Abnormal stress test 12/14/2018-[facility]    Reversible Anterolateral Ischemia in the Territory of the LAD   • Anxiety     Lexapro   • Asthma    • Carotid stenosis, bilateral 12/16/2018    R Noted @ 0-15% & L Noted @ 0-15% Noted on Carotid Report   • Chest pain due to CAD (CMS/[facility]) 12/14/18-[facility]    Severe w/Several Months of SOB Episodes   • Chronic diarrhea     Due to IBS   • COPD (chronic obstructive pulmonary disease) (CMS/[facility]) Hx   • Dyslipidemia    • Dyspepsia Hx    Prilosec   • Dyspnea 2018   • Family history of premature CAD     Paternal Side/Father   • GERD (gastroesophageal reflux disease)     Prilosec   • History of echocardiogram 12/15/18-[facility]    Trivial MVR/Mild TVR Noted, Otherwise All Normal  Findings   • History of EKG 12/14/2018    Normal Sinus Rhythm    • Hormone replacement therapy (HRT)    • Hx of chest x-ray 12/14/18-[facility] ER    WNL   • Hypertension     Controlled w/Meds   • Hypokalemia 12/14/18-[facility]    3.4-Treated   • IBS (irritable bowel syndrome)    • LAD stenosis 12/16/2018    Subtotally Occluded Distal L Noted on Cardiac Cath   • Mild mitral valve regurgitation 12/15/2018    Trivial-Noted on Echo   • Mild tricuspid valve regurgitation 12/15/2018    Noted on Echo   • Obesity    • JS on CPAP    • RCA occlusion (CMS/HCC) 12/16/2018    70% Narrowing Noted on Cardiac Cath   • Seasonal allergies     Claritin PRN   • Snoring    • SOB (shortness of breath) 2018    x Several Months       Past Surgical History:   Procedure Laterality Date   • CARDIAC CATHETERIZATION Left 12/16/18-[facility]    w/Coronary Arteriography/Angiography--Dr. Madera--Severe 3V CAD w/Occluded LAD & 70% Narrowing of RCA; Well-Preserved LV Function Noted   • CORONARY ARTERY BYPASS GRAFT  12/17/2018    X2  Dr Mills   • HYSTERECTOMY           Current Outpatient Medications:   •  albuterol sulfate HFA (VENTOLIN HFA) 108 (90 Base) MCG/ACT inhaler, Daily As Needed., Disp: , Rfl:   •  aspirin 81 MG EC tablet, Take 81 mg by mouth Daily., Disp: , Rfl:   •  atorvastatin (LIPITOR) 40 MG tablet, TAKE 1 TABLET AT BEDTIME, Disp: 90 tablet, Rfl: 2  •  Cyanocobalamin 1000 MCG/ML kit, Inject 1 mL into the appropriate muscle as directed by prescriber Every 30 (Thirty) Days. monthly, Disp: , Rfl:   •  docusate sodium (COLACE) 100 MG capsule, Take 100 mg by mouth Every Night., Disp: , Rfl:   •  escitalopram (LEXAPRO) 10 MG tablet, Take 10 mg by mouth Daily., Disp: , Rfl: 1  •  ferrous sulfate 325 (65 FE) MG tablet, Daily., Disp: , Rfl:   •  fluticasone (FLONASE) 50 MCG/ACT nasal spray, 2 sprays into the nostril(s) as directed by provider Daily., Disp: , Rfl:   •  furosemide (LASIX) 20 MG tablet, Take 20 mg by mouth Daily. Pt takes Mon & Fri, Disp: ,  Rfl:   •  Glucosamine HCl-MSM (MSM GLUCOSAMINE PO), Take  by mouth Every Night., Disp: , Rfl:   •  lansoprazole (PREVACID) 15 MG capsule, Take 15 mg by mouth Daily., Disp: , Rfl: 4  •  lisinopril (PRINIVIL,ZESTRIL) 10 MG tablet, Take 10 mg by mouth Every Night., Disp: , Rfl:   •  loratadine (CLARITIN) 10 MG tablet, Take 10 mg by mouth Daily., Disp: , Rfl:   •  metoprolol succinate XL (TOPROL-XL) 25 MG 24 hr tablet, One tablet in am and one half tablet in pm (Patient taking differently: Take 25 mg by mouth Daily.), Disp: 240 tablet, Rfl: 3  •  montelukast (SINGULAIR) 10 MG tablet, Take 10 mg by mouth Daily., Disp: , Rfl: 1  •  Multiple Minerals-Vitamins (AUDRA MAG ZINC +D3) tablet, Take  by mouth Every Night., Disp: , Rfl:   •  Multiple Vitamins-Minerals (MULTIVITAMIN WITH MINERALS) tablet tablet, Take 1 tablet by mouth Daily., Disp: , Rfl:   •  potassium chloride (K-DUR,KLOR-CON) 20 MEQ CR tablet, Daily. Takes mon and fri, Disp: , Rfl:   •  progesterone (PROMETRIUM) 100 MG capsule, Take 100 mg by mouth Daily., Disp: , Rfl:     Social History     Socioeconomic History   • Marital status:      Spouse name: Not on file   • Number of children: Not on file   • Years of education: Not on file   • Highest education level: Not on file   Occupational History   • Occupation: UNEMPLOYED   Tobacco Use   • Smoking status: Never Smoker   • Smokeless tobacco: Never Used   Substance and Sexual Activity   • Alcohol use: Yes     Binge frequency: Monthly     Comment: 1 Drink/Month   • Drug use: No   • Sexual activity: Defer     Birth control/protection: Surgical     Comment: Hyst       Family History   Problem Relation Age of Onset   • Coronary artery disease Father         CABG Age 45         Review of Systems   General: denies fever, chills, anorexia, weight loss  Eyes: denies blurring, diplopia  Ear/Nose/Throat: denies ear pain, nosebleeds, hoarseness  Cardiovascular: See HPI  Respiratory: denies excessive sputum, hemoptysis,  "wheezing  Gastrointestinal: Has had right upper quadrant pain and postprandial nausea/vomiting-see HPI  Genitourinary: Denies hematuria or dysuria  Musculoskeletal: Minor weightbearing arthralgias not functionally limiting  Skin: denies rashes, itching, suspicious lesions  Neurologic: denies focal neuro deficits  Psychiatric: denies depression, anxiety  Endocrine: denies cold intolerance, heat intolerance  Hematologic/Lymphatic: denies abnormal bruising, bleeding  Allergic/Immunologic: denies urticaria or persistent infections      Objective     Visit Vitals  /78   Pulse 72   Ht 160 cm (62.99\")   Wt 92.5 kg (204 lb)   SpO2 100%   BMI 36.15 kg/m²           Physical Exam  General:     Obese, well developed,, in no acute distress.    Head:     normocephalic and atraumatic.    Eyes:    PERRL/EOM intact, conjunctiva and sclera clear with out nystagmus.    Neck:    no jvd or bruits  Chest Wall:    no deformities   Lungs:    clear bilaterally to auscultation with adequate global airflow   Heart:    non-displaced PMI; regular rate and rhythm, normal S1, S2 without murmurs, rubs, or gallops  Abdomen:  Soft, nontender without HSM  Msk:    no deformity; adequate R OM  Pulses:    pulses normal in all 4 extremities.    Extremities:    no clubbing, cyanosis, edema   Neurologic:    no focal sensory or motor deficits  Skin:    intact without lesions or rashes.    Psych:    alert and cooperative; normal mood and affect; normal attention span and concentration.            ECG 12 Lead  Date/Time: 7/10/2020 8:15 AM  Performed by: CRISSY Madera MD  Authorized by: CRISSY Madera MD   Comparison: compared with previous ECG from 7/8/2020  Similar to previous ECG  Rhythm: sinus rhythm    Clinical impression: normal ECG  Comments: Sinus rhythm with heart rate of 72; no change from previous tracing              Assessment:   Problems Addressed this Visit        Cardiovascular and Mediastinum    Coronary artery disease - Primary    " Hemodynamically stable and angina free  Post two-vessel CABG December 2018 with HANNAH to LAD and reverse saphenous vein graft to PDA; has residual LCx OM disease not amenable to surgical revascularization.          Essential hypertension    -Well-regulated on current medications        Hyperlipidemia, mixed    -Maintained on atorvastatin 40 mg p.o. nightly; followed by PCP           Respiratory    Dyspnea on exertion    No significant increase in PIERCE though currently multifactorial largely related to age weight and level of conditioning           Other    Status post coronary artery bypass graft    -Status post two-vessel CABG December 2018 with HANNAH to LAD and reverse SVG to PDA.  Has small OM branch of left circumflex not amenable to surgical revascularization, nonetheless remains hemodynamically stable and angina free    Reoperative cardiac assessment  -Anticipating HIDA scan and likely laparoscopic cholecystectomy  -Cleared from cardiac standpoint with no evidence of unstable angina factors, acute microinfarction or congestive heart failure that would prohibit elective noncardiac surgery at this time                Plan:  This patient is cleared from cardiac standpoint to proceed with elective HIDA scan if indicated cholecystectomy.  She remains hemodynamically stable well compensated and has no evidence of unstable angina pectoris, acute microinfarction or congestive heart failure that would prohibit elective noncardiac surgery at this time.  She is advised to continue with current medication as listed reviewed in detail.  Post abdominal surgery, she is strongly advised to work on regular progressive weight reduction with regular progressive exercise and ongoing cardiovascular modification in view of her relatively young age with premature CAD    D Andrea Madera MD  7/26/2020 22:17    This report was generated using the Dragon voice recognition system.

## 2020-08-07 ENCOUNTER — OFFICE (AMBULATORY)
Dept: URBAN - METROPOLITAN AREA CLINIC 64 | Facility: CLINIC | Age: 50
End: 2020-08-07

## 2020-08-07 VITALS
HEART RATE: 73 BPM | WEIGHT: 204 LBS | DIASTOLIC BLOOD PRESSURE: 72 MMHG | HEIGHT: 63 IN | SYSTOLIC BLOOD PRESSURE: 112 MMHG

## 2020-08-07 DIAGNOSIS — R13.10 DYSPHAGIA, UNSPECIFIED: ICD-10-CM

## 2020-08-07 DIAGNOSIS — R11.0 NAUSEA: ICD-10-CM

## 2020-08-07 DIAGNOSIS — K30 FUNCTIONAL DYSPEPSIA: ICD-10-CM

## 2020-08-07 PROCEDURE — 99214 OFFICE O/P EST MOD 30 MIN: CPT | Performed by: NURSE PRACTITIONER

## 2020-08-18 ENCOUNTER — ON CAMPUS - OUTPATIENT (AMBULATORY)
Dept: URBAN - METROPOLITAN AREA HOSPITAL 2 | Facility: HOSPITAL | Age: 50
End: 2020-08-18

## 2020-08-18 ENCOUNTER — OFFICE (AMBULATORY)
Dept: URBAN - METROPOLITAN AREA PATHOLOGY 4 | Facility: PATHOLOGY | Age: 50
End: 2020-08-18

## 2020-08-18 VITALS
SYSTOLIC BLOOD PRESSURE: 107 MMHG | RESPIRATION RATE: 18 BRPM | DIASTOLIC BLOOD PRESSURE: 76 MMHG | HEART RATE: 73 BPM | WEIGHT: 204 LBS | OXYGEN SATURATION: 100 % | HEART RATE: 65 BPM | DIASTOLIC BLOOD PRESSURE: 66 MMHG | SYSTOLIC BLOOD PRESSURE: 109 MMHG | HEIGHT: 63 IN | HEART RATE: 72 BPM | SYSTOLIC BLOOD PRESSURE: 142 MMHG | DIASTOLIC BLOOD PRESSURE: 75 MMHG | DIASTOLIC BLOOD PRESSURE: 61 MMHG | TEMPERATURE: 97.5 F | DIASTOLIC BLOOD PRESSURE: 89 MMHG | OXYGEN SATURATION: 99 % | HEART RATE: 75 BPM | SYSTOLIC BLOOD PRESSURE: 100 MMHG | OXYGEN SATURATION: 98 % | SYSTOLIC BLOOD PRESSURE: 130 MMHG | SYSTOLIC BLOOD PRESSURE: 112 MMHG | HEART RATE: 69 BPM | RESPIRATION RATE: 16 BRPM

## 2020-08-18 DIAGNOSIS — K44.9 DIAPHRAGMATIC HERNIA WITHOUT OBSTRUCTION OR GANGRENE: ICD-10-CM

## 2020-08-18 DIAGNOSIS — K31.89 OTHER DISEASES OF STOMACH AND DUODENUM: ICD-10-CM

## 2020-08-18 DIAGNOSIS — R13.10 DYSPHAGIA, UNSPECIFIED: ICD-10-CM

## 2020-08-18 DIAGNOSIS — K25.9 GASTRIC ULCER, UNSPECIFIED AS ACUTE OR CHRONIC, WITHOUT HEMO: ICD-10-CM

## 2020-08-18 DIAGNOSIS — K22.2 ESOPHAGEAL OBSTRUCTION: ICD-10-CM

## 2020-08-18 LAB
GI HISTOLOGY: A. UNSPECIFIED: (no result)
GI HISTOLOGY: PDF REPORT: (no result)

## 2020-08-18 PROCEDURE — 43450 DILATE ESOPHAGUS 1/MULT PASS: CPT | Performed by: INTERNAL MEDICINE

## 2020-08-18 PROCEDURE — 43239 EGD BIOPSY SINGLE/MULTIPLE: CPT | Performed by: INTERNAL MEDICINE

## 2020-08-18 PROCEDURE — 88305 TISSUE EXAM BY PATHOLOGIST: CPT | Performed by: INTERNAL MEDICINE

## 2020-10-22 RX ORDER — ATORVASTATIN CALCIUM 40 MG/1
TABLET, FILM COATED ORAL
Qty: 90 TABLET | Refills: 2 | Status: SHIPPED | OUTPATIENT
Start: 2020-10-22

## 2020-11-05 ENCOUNTER — OFFICE VISIT (OUTPATIENT)
Dept: CARDIOLOGY | Facility: CLINIC | Age: 50
End: 2020-11-05

## 2020-11-05 VITALS
SYSTOLIC BLOOD PRESSURE: 104 MMHG | HEART RATE: 72 BPM | WEIGHT: 193.6 LBS | DIASTOLIC BLOOD PRESSURE: 80 MMHG | BODY MASS INDEX: 34.3 KG/M2 | HEIGHT: 63 IN

## 2020-11-05 DIAGNOSIS — I10 ESSENTIAL HYPERTENSION: ICD-10-CM

## 2020-11-05 DIAGNOSIS — I25.708 CORONARY ARTERY DISEASE OF BYPASS GRAFT OF NATIVE HEART WITH STABLE ANGINA PECTORIS (HCC): Primary | ICD-10-CM

## 2020-11-05 DIAGNOSIS — E78.2 HYPERLIPIDEMIA, MIXED: ICD-10-CM

## 2020-11-05 PROCEDURE — 93000 ELECTROCARDIOGRAM COMPLETE: CPT | Performed by: INTERNAL MEDICINE

## 2020-11-05 PROCEDURE — 99204 OFFICE O/P NEW MOD 45 MIN: CPT | Performed by: INTERNAL MEDICINE

## 2020-11-05 RX ORDER — METOPROLOL SUCCINATE 25 MG/1
25 TABLET, EXTENDED RELEASE ORAL NIGHTLY
COMMUNITY
End: 2021-03-12

## 2020-11-05 NOTE — PROGRESS NOTES
Date of Office Visit: 20  Encounter Provider: Karel Caban MD  Place of Service: [facility]  Patient Name: Khushi Reed  :1970  Referral Provider:Karel Caban MD      Chief Complaint   Patient presents with   • Coronary Artery Disease     History of Present Illness  Mrs Reed is a pleasant 50-year-old female who is a history of course to sleep apnea, hypertension, and hyperlipidemia.  And in  her daughter had been off to college came home and had the flu she woke up the next morning just not feeling well and was having swelling but went to the emergency room per her PCPs advice was also having I believe some shortness of breath while there had a perfusion stress test that was abnormal but actually it sent her home before the result had her come back in she then underwent cardiac catheterization which showed total occlusion of the left anterior descending, apparently severe disease of the distal circumflex, severe disease of the right coronary artery and diagonal.  She had normal left ventricular systolic function at that time.  She then underwent coronary bypass grafting where she had left internal mammary artery to the left anterior descending and saphenous vein graft to the posterior descending artery.  Unable to bypass apparently the distal circumflex.  Says she is never really felt completely better since then she has shortness of breath with increased activity her normal activity she does fine.  But carrying something or going up and down steps.  No real chest pain or pressure.  No orthopnea or PND.  No palpitations no near syncope or syncope.  That in 2020 she was having all these episodes of nausea vomiting went to the emergency room they told her that they thought it could be her gallbladder however HIDA scan did not confirm that.  She now comes in to establish care.  She still complains of just not feeling completely normal  since her surgery.  But again no real rest symptoms and normal activity she is fine.        Past Medical History:   Diagnosis Date   • 3-vessel CAD 12/16/2018    Severe w/Occlusion of LAD & 70% Narrowing of RCA Noted on Cardiac Cath   • Abnormal stress test 12/14/2018-[facility]    Reversible Anterolateral Ischemia in the Territory of the LAD   • Anxiety     Lexapro   • Asthma    • Borderline type 2 diabetes mellitus    • Carotid stenosis, bilateral 12/16/2018    R Noted @ 0-15% & L Noted @ 0-15% Noted on Carotid Report   • Chest pain due to CAD (CMS/HCC) 12/14/18-[facility]    Severe w/Several Months of SOB Episodes   • Chronic diarrhea     Due to IBS   • COPD (chronic obstructive pulmonary disease) (CMS/[facility]) Hx   • Depression    • Dyslipidemia    • Dyspepsia Hx    Prilosec   • Dyspnea 2018   • Family history of premature CAD     Paternal Side/Father   • Gastritis    • GERD (gastroesophageal reflux disease)     Prilosec   • History of echocardiogram 12/15/18-[facility]    Trivial MVR/Mild TVR Noted, Otherwise All Normal Findings   • History of EKG 12/14/2018    Normal Sinus Rhythm    • Hormone replacement therapy (HRT)    • Hx of chest x-ray 12/14/18-[facility]    WNL   • Hypertension     Controlled w/Meds   • Hypokalemia 12/14/18-[facility]    3.4-Treated   • IBS (irritable bowel syndrome)    • LAD stenosis 12/16/2018    Subtotally Occluded Distal L Noted on Cardiac Cath   • Mild mitral valve regurgitation 12/15/2018    Trivial-Noted on Echo   • Mild tricuspid valve regurgitation 12/15/2018    Noted on Echo   • Obesity    • JS on CPAP    • RCA occlusion (CMS/HCC) 12/16/2018    70% Narrowing Noted on Cardiac Cath   • Seasonal allergies     Claritin PRN   • Snoring    • SOB (shortness of breath) 2018    x Several Months         Past Surgical History:   Procedure Laterality Date   • CARDIAC CATHETERIZATION Left 12/16/18-[facility]    w/Coronary Arteriography/Angiography--Dr. Madera--Severe 3V CAD w/Occluded LAD & 70% Narrowing of RCA; Well-Preserved  LV Function Noted   • CORONARY ARTERY BYPASS GRAFT  12/17/2018    X2  Dr Mills   • HYSTERECTOMY           Current Outpatient Medications on File Prior to Visit   Medication Sig Dispense Refill   • albuterol sulfate HFA (VENTOLIN HFA) 108 (90 Base) MCG/ACT inhaler Daily As Needed.     • aspirin 81 MG EC tablet Take 81 mg by mouth Daily.     • atorvastatin (LIPITOR) 40 MG tablet TAKE 1 TABLET AT BEDTIME 90 tablet 2   • docusate sodium (COLACE) 100 MG capsule Take 100 mg by mouth Every Night.     • escitalopram (LEXAPRO) 10 MG tablet Take 10 mg by mouth Daily.  1   • ferrous sulfate 325 (65 FE) MG tablet Daily.     • fluticasone (FLONASE) 50 MCG/ACT nasal spray 2 sprays into the nostril(s) as directed by provider Daily.     • furosemide (LASIX) 20 MG tablet Take 20 mg by mouth Daily. Pt takes Mon & Fri     • Glucosamine HCl-MSM (MSM GLUCOSAMINE PO) Take  by mouth Every Night.     • lansoprazole (PREVACID) 15 MG capsule Take 15 mg by mouth Daily.  4   • lisinopril (PRINIVIL,ZESTRIL) 10 MG tablet Take 10 mg by mouth Every Night.     • loratadine (CLARITIN) 10 MG tablet Take 10 mg by mouth Daily.     • metoprolol succinate XL (TOPROL-XL) 25 MG 24 hr tablet Take 25 mg by mouth Every Night.     • montelukast (SINGULAIR) 10 MG tablet Take 10 mg by mouth Daily.  1   • Multiple Minerals-Vitamins (AUDRA MAG ZINC +D3) tablet Take  by mouth Every Night.     • Multiple Vitamins-Minerals (MULTIVITAMIN WITH MINERALS) tablet tablet Take 1 tablet by mouth Daily.     • potassium chloride (K-DUR,KLOR-CON) 20 MEQ CR tablet Daily. Takes mon and fri     • [DISCONTINUED] metoprolol succinate XL (TOPROL-XL) 25 MG 24 hr tablet One tablet in am and one half tablet in pm (Patient taking differently: Take 25 mg by mouth Daily.) 240 tablet 3   • Cyanocobalamin 1000 MCG/ML kit Inject 1 mL into the appropriate muscle as directed by prescriber Every 30 (Thirty) Days. monthly     • progesterone (PROMETRIUM) 100 MG capsule Take 100 mg by mouth  Daily.       No current facility-administered medications on file prior to visit.          Social History     Socioeconomic History   • Marital status:      Spouse name: Not on file   • Number of children: Not on file   • Years of education: Not on file   • Highest education level: Not on file   Occupational History   • Occupation: UNEMPLOYED   Tobacco Use   • Smoking status: Never Smoker   • Smokeless tobacco: Never Used   • Tobacco comment: CAFFEINE USE- TEA   Substance and Sexual Activity   • Alcohol use: Yes     Binge frequency: Monthly     Comment: social   • Drug use: No   • Sexual activity: Defer     Birth control/protection: Surgical     Comment: Hyst   Lifestyle   • Physical activity     Days per week: 0 days     Minutes per session: 0 min   • Stress: To some extent       Family History   Problem Relation Age of Onset   • Coronary artery disease Father         CABG Age 45   • Stomach cancer Father    • Hypertension Father    • Hypertension Mother    • Cancer Paternal Grandmother    • Hypertension Paternal Grandmother    • Colon cancer Paternal Grandfather          Review of Systems   Constitution: Negative for decreased appetite, diaphoresis, fever, malaise/fatigue, weight gain and weight loss.   HENT: Negative for congestion, hearing loss, nosebleeds and tinnitus.    Eyes: Negative for blurred vision, double vision, vision loss in left eye, vision loss in right eye and visual disturbance.   Cardiovascular:        As noted in HPI   Respiratory:        As noted HPI   Endocrine: Negative for cold intolerance and heat intolerance.   Hematologic/Lymphatic: Negative for bleeding problem. Does not bruise/bleed easily.   Skin: Negative for color change, flushing, itching and rash.   Musculoskeletal: Positive for joint pain and joint swelling. Negative for arthritis, back pain, muscle weakness and myalgias.   Gastrointestinal: Negative for bloating, abdominal pain, constipation, diarrhea, dysphagia,  "heartburn, hematemesis, hematochezia, melena, nausea and vomiting.   Genitourinary: Negative for bladder incontinence, dysuria, frequency, nocturia and urgency.   Neurological: Positive for headaches. Negative for dizziness, focal weakness, light-headedness, loss of balance, numbness, paresthesias, vertigo and weakness.   Psychiatric/Behavioral: Positive for depression. Negative for memory loss and substance abuse.       Procedures      ECG 12 Lead    Date/Time: 11/5/2020 10:30 AM  Performed by: Karel Caban MD  Authorized by: Karel Caban MD   Comparison: compared with previous ECG   Similar to previous ECG  Rhythm: sinus rhythm  Rate: normal  QRS axis: normal                  Objective:    /80 (BP Location: Right arm)   Pulse 72   Ht 160 cm (63\")   Wt 87.8 kg (193 lb 9.6 oz)   BMI 34.29 kg/m²        Physical Exam  Vitals signs reviewed.   Constitutional:       General: Not in acute distress.     Appearance: Well-developed. Not diaphoretic.   Eyes:      General: No scleral icterus.     Conjunctiva/sclera: Conjunctivae normal.      Pupils: Pupils are equal, round, and reactive to light.   HENT:      Head: Normocephalic.   Neck:      Musculoskeletal: No edema or erythema.      Thyroid: No thyromegaly.      Vascular: Normal carotid pulses. No carotid bruit, hepatojugular reflux or JVD.      Trachea: No tracheal deviation.   Pulmonary:      Effort: Pulmonary effort is normal. No respiratory distress.      Breath sounds: Normal breath sounds. No decreased breath sounds. No wheezing. No rhonchi. No rales.   Chest:      Chest wall: Not tender to palpatation.   Cardiovascular:      PMI at left midclavicular line. Normal rate. Regular rhythm. S1 with normal intensity. S2 with normal intensity.      Murmurs: There is no murmur.      No gallop. No click. No rub.   Pulses:     Intact distal pulses.   Edema:     Peripheral edema absent.   Abdominal:      General: Bowel sounds are normal. There is no " distension.      Palpations: Abdomen is soft. There is no abdominal mass.      Tenderness: There is no abdominal tenderness. There is no guarding or rebound.   Musculoskeletal:         General: No tenderness or deformity.      Extremities: No clubbing present.  Skin:     General: Skin is warm and dry. There is no cyanosis.      Coloration: Skin is not pale.      Findings: No erythema or rash.   Neurological:      Mental Status: Alert and oriented to person, place, and time.   Psychiatric:         Speech: Speech normal.         Behavior: Behavior normal.         Thought Content: Thought content normal.         Judgment: Judgment normal.             Assessment:   1.  50-year-old female with coronary disease preserved left ventricular systolic function.  Status post coronary bypass grafting in 2018.  By my assessment non-bypassed areas that could be a cause of residual ischemia of the diagonal and the distal circumflex.  This certainly could be the cause of some of her symptoms however it is also possible she could have some type of graft occlusion.  It is also possible that these are noncardiac symptoms and some of it could be anxiety given her history.  At this time we can have her return for a perfusion stress test as a 1 day walking protocol.  If that is compatible with the above ischemia or normal would consider taking her off lisinopril try her on amlodipine to see if this helps improve her symptoms.  Obviously if markedly abnormal may need repeat catheterization.  2.  Hypertension blood pressure goal can as outlined above.  3.  History obstructive sleep apnea on CPAP.  4.  History of hyperlipidemia last LDL I see from 2019 was 40 at goal however she had some recent one done that we are going to get copies of.    If stable will consider seeing her again in follow-up in 6 months.         Plan:

## 2020-11-12 ENCOUNTER — TELEPHONE (OUTPATIENT)
Dept: CARDIOLOGY | Facility: CLINIC | Age: 50
End: 2020-11-12

## 2020-11-12 ENCOUNTER — HOSPITAL ENCOUNTER (OUTPATIENT)
Dept: CARDIOLOGY | Facility: HOSPITAL | Age: 50
Discharge: HOME | End: 2020-11-12
Admitting: INTERNAL MEDICINE

## 2020-11-12 VITALS — WEIGHT: 193 LBS | HEIGHT: 63 IN | BODY MASS INDEX: 34.2 KG/M2

## 2020-11-12 LAB
BH CV NUCLEAR PRIOR STUDY: 2
BH CV STRESS BP STAGE 1: NORMAL
BH CV STRESS BP STAGE 2: NORMAL
BH CV STRESS BP STAGE 3: NORMAL
BH CV STRESS DURATION MIN STAGE 1: 3
BH CV STRESS DURATION MIN STAGE 2: 3
BH CV STRESS DURATION MIN STAGE 3: 3
BH CV STRESS DURATION SEC STAGE 1: 0
BH CV STRESS DURATION SEC STAGE 2: 0
BH CV STRESS DURATION SEC STAGE 3: 0
BH CV STRESS GRADE STAGE 1: 10
BH CV STRESS GRADE STAGE 2: 12
BH CV STRESS GRADE STAGE 3: 14
BH CV STRESS HR STAGE 1: 120
BH CV STRESS HR STAGE 2: 140
BH CV STRESS HR STAGE 3: 164
BH CV STRESS METS STAGE 1: 5
BH CV STRESS METS STAGE 2: 7.5
BH CV STRESS METS STAGE 3: 10
BH CV STRESS PROTOCOL 1: NORMAL
BH CV STRESS RECOVERY BP: NORMAL MMHG
BH CV STRESS RECOVERY HR: 99 BPM
BH CV STRESS SPEED STAGE 1: 1.7
BH CV STRESS SPEED STAGE 2: 2.5
BH CV STRESS SPEED STAGE 3: 3.4
BH CV STRESS STAGE 1: 1
BH CV STRESS STAGE 2: 2
BH CV STRESS STAGE 3: 3
LV EF NUC BP: 67 %
MAXIMAL PREDICTED HEART RATE: 171 BPM
PERCENT MAX PREDICTED HR: 95.91 %
STRESS BASELINE BP: NORMAL MMHG
STRESS BASELINE HR: 92 BPM
STRESS PERCENT HR: 113 %
STRESS POST ESTIMATED WORKLOAD: 10 METS
STRESS POST EXERCISE DUR MIN: 9 MIN
STRESS POST EXERCISE DUR SEC: 0 SEC
STRESS POST PEAK BP: NORMAL MMHG
STRESS POST PEAK HR: 164 BPM
STRESS TARGET HR: 145 BPM

## 2020-11-12 PROCEDURE — 78452 HT MUSCLE IMAGE SPECT MULT: CPT

## 2020-11-12 PROCEDURE — A9502 TC99M TETROFOSMIN: HCPCS | Performed by: INTERNAL MEDICINE

## 2020-11-12 PROCEDURE — 93017 CV STRESS TEST TRACING ONLY: CPT

## 2020-11-12 PROCEDURE — 78452 HT MUSCLE IMAGE SPECT MULT: CPT | Performed by: INTERNAL MEDICINE

## 2020-11-12 PROCEDURE — 93018 CV STRESS TEST I&R ONLY: CPT | Performed by: INTERNAL MEDICINE

## 2020-11-12 PROCEDURE — 93016 CV STRESS TEST SUPVJ ONLY: CPT | Performed by: INTERNAL MEDICINE

## 2020-11-12 PROCEDURE — 0 TECHNETIUM TETROFOSMIN KIT: Performed by: INTERNAL MEDICINE

## 2020-11-12 RX ORDER — AMLODIPINE BESYLATE 5 MG/1
5 TABLET ORAL DAILY
Qty: 30 TABLET | Refills: 11 | Status: SHIPPED | OUTPATIENT
Start: 2020-11-12

## 2020-11-12 RX ADMIN — TETROFOSMIN 1 DOSE: 1.38 INJECTION, POWDER, LYOPHILIZED, FOR SOLUTION INTRAVENOUS at 07:25

## 2020-11-12 RX ADMIN — TETROFOSMIN 1 DOSE: 1.38 INJECTION, POWDER, LYOPHILIZED, FOR SOLUTION INTRAVENOUS at 08:10

## 2020-11-12 NOTE — TELEPHONE ENCOUNTER
Reading Physician Reading Date Result Priority   Karel Caban MD  [phone] 11/12/2020 Routine   Karel Caban MD  [phone] 11/12/2020    Karel Caban MD  [phone] 11/12/2020       Result Text     · Myocardial perfusion imaging indicates a normal myocardial perfusion study with no evidence of ischemia.  · Left ventricular ejection fraction is normal. (Calculated EF = 67%).  · Impressions are consistent with a low risk study.

## 2020-12-22 ENCOUNTER — OFFICE VISIT (OUTPATIENT)
Dept: CARDIOLOGY | Facility: CLINIC | Age: 50
End: 2020-12-22

## 2020-12-22 VITALS
RESPIRATION RATE: 16 BRPM | OXYGEN SATURATION: 98 % | SYSTOLIC BLOOD PRESSURE: 122 MMHG | DIASTOLIC BLOOD PRESSURE: 78 MMHG | HEART RATE: 68 BPM | BODY MASS INDEX: 32.96 KG/M2 | WEIGHT: 186 LBS | HEIGHT: 63 IN

## 2020-12-22 DIAGNOSIS — I10 ESSENTIAL HYPERTENSION: ICD-10-CM

## 2020-12-22 DIAGNOSIS — I25.708 CORONARY ARTERY DISEASE OF BYPASS GRAFT OF NATIVE HEART WITH STABLE ANGINA PECTORIS (HCC): Primary | ICD-10-CM

## 2020-12-22 DIAGNOSIS — I65.23 CAROTID STENOSIS, BILATERAL: ICD-10-CM

## 2020-12-22 DIAGNOSIS — E78.2 HYPERLIPIDEMIA, MIXED: ICD-10-CM

## 2020-12-22 PROCEDURE — 93000 ELECTROCARDIOGRAM COMPLETE: CPT | Performed by: NURSE PRACTITIONER

## 2020-12-22 PROCEDURE — 99214 OFFICE O/P EST MOD 30 MIN: CPT | Performed by: NURSE PRACTITIONER

## 2020-12-22 NOTE — PROGRESS NOTES
Date of Office Visit: 20  Encounter Provider: PAPI Browne  Place of Service: [facility]  Patient Name: Khushi Reed  :1970    Chief Complaint   Patient presents with   • Coronary Artery Disease     CAD, HTN, Increased lipids    :     HPI: Khushi Reed is a 50 y.o. female  with history of hypertension, hyperlipidemia, coronary artery disease status post coronary artery bypass grafting, and obstructive sleep apnea.  She had a hysterectomy at age 32.  Her family history includes premature coronary artery disease involving her father and paternal grandmother who both had CABG before the age of 50.  She has 2 children a son who is 31 and a daughter who is oblique 24.   She was previously followed by Dr. Madera. She is now followed by Dr. Caban.    I will visit with her for the first time today and have reviewed her medical record.  She does not have severe coronary artery disease and underwent coronary artery bypass grafting with left internal mammary artery to the left anterior descending, saphenous vein graft to posterior descending artery in .  They were unable to bypass the distal circumflex.  She continued to have some shortness of breath and lightheadedness compatible with orthostasis.  In 2020 she had issues with nausea and vomiting.  She had an ultrasound of the gallbladder which was normal perfusion stress test 2020 showed no evidence of ischemia.  Lisinopril was stopped and she was started on amlodipine for antianginal benefit.    Visit today in follow-up.  She has been tolerating amlodipine and has noted improved shortness of breath with exertion as well as improved lightheadedness with exercise.  She and her  are doing some remodeling in the house so she is staying more active than usual.  She is also started exercising 15 minutes 4 to 5 days a week.  She has an exercise bike at home she plans to start using once  the remodeling settles down.  She has no palpitation, edema, chest pain tightness pressure or fatigue.  She has been following her blood pressure at home and 1 day her systolic was 92 but she was dehydrated increased her fluids and has not had any recurrence of that.              Allergies   Allergen Reactions   • Latex Rash     Unknown   • Other Itching and Rash     Natural Rubber       Past Medical History:   Diagnosis Date   • 3-vessel CAD 12/16/2018    Severe w/Occlusion of LAD & 70% Narrowing of RCA Noted on Cardiac Cath   • Abnormal stress test 12/14/2018-[facility]    Reversible Anterolateral Ischemia in the Territory of the LAD   • Anxiety     Lexapro   • Asthma    • Borderline type 2 diabetes mellitus    • Carotid stenosis, bilateral 12/16/2018    R Noted @ 0-15% & L Noted @ 0-15% Noted on Carotid Report   • Chest pain due to CAD (CMS/HCC) 12/14/18-[facility]    Severe w/Several Months of SOB Episodes   • Chronic diarrhea     Due to IBS   • COPD (chronic obstructive pulmonary disease) (CMS/[facility]) Hx   • Depression    • Dyslipidemia    • Dyspepsia Hx    Prilosec   • Dyspnea 2018   • Family history of premature CAD     Paternal Side/Father   • Gastritis    • GERD (gastroesophageal reflux disease)     Prilosec   • History of echocardiogram 12/15/18-[facility]    Trivial MVR/Mild TVR Noted, Otherwise All Normal Findings   • History of EKG 12/14/2018    Normal Sinus Rhythm    • Hormone replacement therapy (HRT)    • Hx of chest x-ray 12/14/18-[facility]    WNL   • Hypertension     Controlled w/Meds   • Hypokalemia 12/14/18-[facility]    3.4-Treated   • IBS (irritable bowel syndrome)    • LAD stenosis 12/16/2018    Subtotally Occluded Distal L Noted on Cardiac Cath   • Mild mitral valve regurgitation 12/15/2018    Trivial-Noted on Echo   • Mild tricuspid valve regurgitation 12/15/2018    Noted on Echo   • Obesity    • JS on CPAP    • RCA occlusion (CMS/HCC) 12/16/2018    70% Narrowing Noted on Cardiac Cath   • Seasonal allergies     Claritin  "PRN   • Snoring    • SOB (shortness of breath) 2018    x Several Months       Past Surgical History:   Procedure Laterality Date   • CARDIAC CATHETERIZATION Left 12/16/18-[facility]    w/Coronary Arteriography/Angiography--Dr. Maedra--Severe 3V CAD w/Occluded LAD & 70% Narrowing of RCA; Well-Preserved LV Function Noted   • CORONARY ARTERY BYPASS GRAFT  12/17/2018    X2  Dr Mills   • HYSTERECTOMY           Family and social history reviewed.     Review of Systems   Cardiovascular: Positive for dyspnea on exertion.   Musculoskeletal: Positive for joint pain.   Neurological: Positive for headaches and light-headedness.     All other systems were reviewed and are negative          Objective:     Vitals:    12/22/20 1340   BP: 122/78   Pulse: 68   Resp: 16   SpO2: 98%   Weight: 84.4 kg (186 lb)   Height: 160 cm (63\")     Body mass index is 32.95 kg/m².    PHYSICAL EXAM:  Constitutional:       General: Not in acute distress.     Appearance: Well-developed. Not diaphoretic.   Eyes:      Conjunctiva/sclera: Conjunctivae normal.   HENT:      Head: Normocephalic.   Neck:      Musculoskeletal: Normal range of motion.      Vascular: No JVD.   Pulmonary:      Effort: Pulmonary effort is normal. No respiratory distress.      Breath sounds: Normal breath sounds. No wheezing. No rhonchi. No rales.   Chest:      Chest wall: Not tender to palpatation.   Cardiovascular:      Normal rate. Regular rhythm.   Abdominal:      General: Bowel sounds are normal. There is no distension.      Palpations: Abdomen is soft.   Musculoskeletal: Normal range of motion.   Skin:     General: Skin is warm and dry. There is no cyanosis.      Findings: No rash.   Neurological:      Mental Status: Alert and oriented to person, place, and time.   Psychiatric:         Behavior: Behavior normal.         Thought Content: Thought content normal.         Judgment: Judgment normal.           ECG 12 Lead    Date/Time: 12/22/2020 1:57 PM  Performed by: Ran" PAPI Antoine  Authorized by: Elina Tong APRN   Comparison: compared with previous ECG   Similar to previous ECG  Rhythm: sinus rhythm  Rate: normal  T inversion: V1 and V2  T flattening: aVL, V3 and V4  Comments: No change            Current Outpatient Medications   Medication Sig Dispense Refill   • amLODIPine (NORVASC) 5 MG tablet Take 1 tablet by mouth Daily. 30 tablet 11   • aspirin 81 MG EC tablet Take 81 mg by mouth Daily.     • atorvastatin (LIPITOR) 40 MG tablet TAKE 1 TABLET AT BEDTIME 90 tablet 2   • docusate sodium (COLACE) 100 MG capsule Take 100 mg by mouth Every Night.     • escitalopram (LEXAPRO) 10 MG tablet Take 10 mg by mouth Daily.  1   • ferrous sulfate 325 (65 FE) MG tablet Daily.     • fluticasone (FLONASE) 50 MCG/ACT nasal spray 2 sprays into the nostril(s) as directed by provider Daily.     • furosemide (LASIX) 20 MG tablet Take 20 mg by mouth Daily. Pt takes Mon & Fri     • Glucosamine HCl-MSM (MSM GLUCOSAMINE PO) Take  by mouth Every Night.     • lansoprazole (PREVACID) 15 MG capsule Take 15 mg by mouth Daily.  4   • loratadine (CLARITIN) 10 MG tablet Take 10 mg by mouth Daily.     • metoprolol succinate XL (TOPROL-XL) 25 MG 24 hr tablet Take 25 mg by mouth Every Night.     • montelukast (SINGULAIR) 10 MG tablet Take 10 mg by mouth Daily.  1   • Multiple Minerals-Vitamins (AUDRA MAG ZINC +D3) tablet Take  by mouth Every Night.     • Multiple Vitamins-Minerals (MULTIVITAMIN WITH MINERALS) tablet tablet Take 1 tablet by mouth Daily.     • potassium chloride (K-DUR,KLOR-CON) 20 MEQ CR tablet Daily. Takes mon and fri     • albuterol sulfate HFA (VENTOLIN HFA) 108 (90 Base) MCG/ACT inhaler Daily As Needed.     • Cyanocobalamin 1000 MCG/ML kit Inject 1 mL into the appropriate muscle as directed by prescriber Every 30 (Thirty) Days. monthly     • progesterone (PROMETRIUM) 100 MG capsule Take 100 mg by mouth Daily.       No current facility-administered medications for this visit.       Assessment:       Diagnosis Plan   1. Coronary artery disease of bypass graft of native heart with stable angina pectoris (CMS/[facility])  ECG 12 Lead   2. Essential hypertension     3. Hyperlipidemia, mixed     4. Carotid stenosis, bilateral          Orders Placed This Encounter   Procedures   • ECG 12 Lead     This order was created via procedure documentation         Plan:       1.  50-year-old female coronary artery disease, preserved left ventricular systolic function status post coronary artery bypass grafting in 2018  but was unable to bypass the distal circumflex leaving residual ischemia areas the diagonal and distal circumflex.  She had a negative perfusion stress test November 2020 and has had symptom improvement with addition of amlodipine antianginal.  She is tolerating that we will continue  2.  Hypertension blood pressure adequately controlled  3.  History of  obstructive sleep apnea  4.  Hyperlipidemia target LDL 70 or less on target dose atorvastatin  5. UTI- she is on her third round of antibiotic following with oncology  6. Anxiety   7.  Mild bilateral carotid artery stenosis on duplex 2018 consider follow-up study    She prefers to follow-up at the have a heart clinic and we will arrange for that in 3 months          It has been a pleasure to participate in this patient's care.      Thank you,  PAIP Browne      **I used Dragon to dictate this note:**

## 2021-03-12 RX ORDER — METOPROLOL SUCCINATE 25 MG/1
TABLET, EXTENDED RELEASE ORAL
Qty: 135 TABLET | Refills: 1 | Status: SHIPPED | OUTPATIENT
Start: 2021-03-12

## 2021-04-29 RX ORDER — ATORVASTATIN CALCIUM 40 MG/1
TABLET, FILM COATED ORAL
Qty: 90 TABLET | Refills: 2 | OUTPATIENT
Start: 2021-04-29

## 2021-08-09 RX ORDER — METOPROLOL SUCCINATE 25 MG/1
TABLET, EXTENDED RELEASE ORAL
Qty: 135 TABLET | Refills: 1 | OUTPATIENT
Start: 2021-08-09